# Patient Record
Sex: MALE | Race: WHITE | Employment: UNEMPLOYED | URBAN - METROPOLITAN AREA
[De-identification: names, ages, dates, MRNs, and addresses within clinical notes are randomized per-mention and may not be internally consistent; named-entity substitution may affect disease eponyms.]

---

## 2023-03-13 ENCOUNTER — APPOINTMENT (OUTPATIENT)
Dept: GENERAL RADIOLOGY | Age: 53
End: 2023-03-13

## 2023-03-13 ENCOUNTER — HOSPITAL ENCOUNTER (EMERGENCY)
Age: 53
Discharge: HOME | End: 2023-03-13

## 2023-03-13 ENCOUNTER — TELEPHONE (OUTPATIENT)
Dept: ORTHOPEDIC SURGERY | Age: 53
End: 2023-03-13

## 2023-03-13 VITALS
BODY MASS INDEX: 23.1 KG/M2 | OXYGEN SATURATION: 100 % | HEART RATE: 52 BPM | TEMPERATURE: 97.4 F | WEIGHT: 165 LBS | HEIGHT: 71 IN | RESPIRATION RATE: 16 BRPM | SYSTOLIC BLOOD PRESSURE: 145 MMHG | DIASTOLIC BLOOD PRESSURE: 82 MMHG

## 2023-03-13 DIAGNOSIS — S42.302A CLOSED FRACTURE OF SHAFT OF LEFT HUMERUS, UNSPECIFIED FRACTURE MORPHOLOGY, INITIAL ENCOUNTER: Primary | ICD-10-CM

## 2023-03-13 PROCEDURE — 6370000000 HC RX 637 (ALT 250 FOR IP): Performed by: NURSE PRACTITIONER

## 2023-03-13 PROCEDURE — 29105 APPLICATION LONG ARM SPLINT: CPT

## 2023-03-13 PROCEDURE — 99283 EMERGENCY DEPT VISIT LOW MDM: CPT

## 2023-03-13 PROCEDURE — 73060 X-RAY EXAM OF HUMERUS: CPT

## 2023-03-13 RX ORDER — IBUPROFEN 600 MG/1
600 TABLET ORAL ONCE
Status: DISCONTINUED | OUTPATIENT
Start: 2023-03-13 | End: 2023-03-13

## 2023-03-13 RX ORDER — ONDANSETRON 4 MG/1
4 TABLET, FILM COATED ORAL EVERY 8 HOURS PRN
Qty: 6 TABLET | Refills: 0 | Status: SHIPPED | OUTPATIENT
Start: 2023-03-13

## 2023-03-13 RX ORDER — ACETAMINOPHEN 500 MG
1000 TABLET ORAL ONCE
Status: COMPLETED | OUTPATIENT
Start: 2023-03-13 | End: 2023-03-13

## 2023-03-13 RX ORDER — HYDROCODONE BITARTRATE AND ACETAMINOPHEN 5; 325 MG/1; MG/1
1 TABLET ORAL EVERY 6 HOURS PRN
Qty: 12 TABLET | Refills: 0 | Status: SHIPPED | OUTPATIENT
Start: 2023-03-13 | End: 2023-03-16

## 2023-03-13 RX ADMIN — ACETAMINOPHEN 1000 MG: 500 TABLET ORAL at 09:52

## 2023-03-13 ASSESSMENT — ENCOUNTER SYMPTOMS
BACK PAIN: 0
RHINORRHEA: 0
SHORTNESS OF BREATH: 0
VOMITING: 0
DIARRHEA: 0
SORE THROAT: 0
ABDOMINAL PAIN: 0
EYE PAIN: 0
NAUSEA: 0
COUGH: 0

## 2023-03-13 ASSESSMENT — PAIN DESCRIPTION - DESCRIPTORS: DESCRIPTORS: DULL;ACHING

## 2023-03-13 ASSESSMENT — PAIN SCALES - GENERAL
PAINLEVEL_OUTOF10: 5
PAINLEVEL_OUTOF10: 6
PAINLEVEL_OUTOF10: 6

## 2023-03-13 ASSESSMENT — PAIN - FUNCTIONAL ASSESSMENT: PAIN_FUNCTIONAL_ASSESSMENT: 0-10

## 2023-03-13 ASSESSMENT — PAIN DESCRIPTION - ORIENTATION
ORIENTATION: LEFT

## 2023-03-13 ASSESSMENT — PAIN DESCRIPTION - FREQUENCY: FREQUENCY: CONTINUOUS

## 2023-03-13 ASSESSMENT — PAIN DESCRIPTION - LOCATION
LOCATION: ARM

## 2023-03-13 ASSESSMENT — PAIN DESCRIPTION - PAIN TYPE: TYPE: ACUTE PAIN

## 2023-03-13 NOTE — ED NOTES
Applied long arm splint to patient left arm. It was difficult to apply splint properly due to the patient not wanting to take pain meds. The patient kept moving and would not allow us to do the 90 degree angle properly due to the pain. I did the best I could with the circumstance. Placed the patient in a sling. Andrew Waggoner NP reviewed splint.       Xavier Estrada  02/73/[address]  10/95/10 1104

## 2023-03-13 NOTE — TELEPHONE ENCOUNTER
Appointment Request     Patient requesting earlier appointment: Yes  Appointment offered to patient: NO - NOTHING AVAILABLE   Patient Contact Number: [phone]    Pt [address] REQUESTING ASAP APPT WITH DR MARINO. WE LOOKED AT [address], OR WED. REQUESTING SOONEST APPT, AND PREFER  IF THE Pt CAN BE WORK IN  ON TUES. IF HE HAS TO WAIT, IT COULD BE Wednesday, BUT NOT LATER THAN Wednesday. Pt AWARE MESSAGE IS BEING SENT AND THAT SOMEONE WILL CALL HIM BACK.

## 2023-03-13 NOTE — Clinical Note
Paty Johnson was seen and treated in our emergency department on 3/13/2023. He may return to work on 03/16/2023. If you have any questions or concerns, please don't hesitate to call.       Anupama Vargas, DENNISE - CNP

## 2023-03-13 NOTE — ED NOTES
2321- perfect serve sent to [facility]     [address] [place] returned the call and spoke to [address]  03/13/23 [address]  03/13/23 9059

## 2023-03-13 NOTE — ED PROVIDER NOTES
[address] ED  EMERGENCY DEPARTMENT ENCOUNTER        Pt Name: Phillip Peña  MRN: [id number]  [place] 1970  Date of evaluation: 3/13/2023  Provider: DENNISE Peralta CNP  PCP: None Provider  Note Started: 9:25 AM EDT 3/13/23      VERONICA. I have evaluated this patient. My supervising physician was available for consultation. CHIEF COMPLAINT       Chief Complaint   Patient presents with    Fall     Pt reports that he fell this am on ice and landed on L arm. Pt c/o pain around L elbow. Pt denies injuries to head, or back or other areas     Arm Pain       HISTORY OF PRESENT ILLNESS: 1 or more Elements     History From: Patient    Limitations to history : None    Social Determinants Significantly Affecting Health : None    Chief Complaint: Left elbow pain    Phillip Peña is a 45 y.o. male who presents to the emergency department symptoms of left elbow pain after a fall. Patient reported that he was walking outside and slipped on ice causing a fall landed on his left elbow. Patient states that he suddenly now has pain in the left elbow. No open injury. No head or neck injury. No loss of consciousness. Denies any back pain chest pain or abdominal pain. Patient denies any pain in his lower extremities. Otherwise states feeling well. Nursing Notes were all reviewed and agreed with or any disagreements were addressed in the HPI. REVIEW OF SYSTEMS :      Review of Systems   Constitutional:  Negative for chills, diaphoresis and fever. HENT:  Negative for congestion, ear pain, rhinorrhea and sore throat. Eyes:  Negative for pain and visual disturbance. Respiratory:  Negative for cough and shortness of breath. Cardiovascular:  Negative for chest pain and leg swelling. Gastrointestinal:  Negative for abdominal pain, diarrhea, nausea and vomiting. Genitourinary:  Negative for difficulty urinating, dysuria, flank pain and frequency.    Musculoskeletal:  Negative for back pain and neck pain. Left elbow pain   Skin:  Negative for rash and wound. Neurological:  Negative for dizziness and light-headedness. Positives and Pertinent negatives as per HPI. SURGICAL HISTORY     Past Surgical History:   Procedure Laterality Date    ABDOMEN SURGERY      WOUND TO ABDOMEN    EYE SURGERY      LASMANDY       [address]       Discharge Medication List as of 3/13/2023 11:13 AM        CONTINUE these medications which have NOT CHANGED    Details   acetaminophen (TYLENOL) 500 MG tablet Take 500 mg by mouth every 6 hours as needed. ALLERGIES     Patient has no known allergies. FAMILYHISTORY       Family History   Problem Relation Age of Onset    Heart Disease Mother         CHF    High Blood Pressure Mother     High Cholesterol Mother     Heart Failure Mother     Heart Attack Mother         SOCIAL HISTORY       Social History     Tobacco Use    Smoking status: Every Day     Packs/day: 2.00     Years: 31.00     Pack years: 62.00     Types: Cigarettes    Smokeless tobacco: Never   Substance Use Topics    Alcohol use: Yes     Comment: SOCIALLY    Drug use: No       SCREENINGS        [place] Coma Scale  Eye Opening: Spontaneous  Best Verbal Response: Oriented  Best Motor Response: Obeys commands  [place] Coma Scale Score: 15                CIWA Assessment  BP: (!) 145/82  Heart Rate: 52           PHYSICAL EXAM  1 or more Elements     ED Triage Vitals [03/13/23 0858]   BP Temp Temp Source Heart Rate Resp SpO2 Height Weight   (!) 145/82 97.4 °F (36.3 °C) Oral 52 16 100 % 5' 11\" (1.803 m) 165 lb (74.8 kg)       Physical Exam  Vitals and nursing note reviewed. Constitutional:       Appearance: Normal appearance. He is not toxic-appearing or diaphoretic. HENT:      Head: Normocephalic and atraumatic. Nose: Nose normal.   Eyes:      General:         Right eye: No discharge. Left eye: No discharge.    Cardiovascular:      Rate and Rhythm: Normal rate and regular rhythm. Pulses: Normal pulses. Heart sounds: No murmur heard. Pulmonary:      Effort: Pulmonary effort is normal. No respiratory distress. Breath sounds: No wheezing or rhonchi. Abdominal:      Palpations: Abdomen is soft. Tenderness: There is no abdominal tenderness. There is no guarding or rebound. Musculoskeletal:         General: Normal range of motion. Left elbow: Tenderness present in medial epicondyle. Cervical back: Normal range of motion and neck supple. Right lower leg: No edema. Left lower leg: No edema. Skin:     General: Skin is warm and dry. Neurological:      General: No focal deficit present. Mental Status: He is alert and oriented to person, place, and time. Psychiatric:         Mood and Affect: Mood normal.         Behavior: Behavior normal.         DIAGNOSTIC RESULTS   LABS:    Labs Reviewed - No data to display    When ordered only abnormal lab results are displayed. All other labs were within normal range or not returned as of this dictation. EKG: When ordered, EKG's are interpreted by the Emergency Department Physician in the absence of a cardiologist.  Please see their note for interpretation of EKG. RADIOLOGY:   Non-plain film images such as CT, Ultrasound and MRI are read by the radiologist. Plain radiographic images are visualized and preliminarily interpreted by the ED Provider with the below findings:        Interpretation per the Radiologist below, if available at the time of this note:    XR HUMERUS LEFT (MIN 2 VIEWS)   Final Result   Displaced, significantly angulated and comminuted fracture in the midshaft of   left humerus. No results found. No results found.     PROCEDURES   Unless otherwise noted below, none     Procedures    CRITICAL CARE TIME (.cctime)       PAST MEDICAL HISTORY      has a past medical history of Heavy smoker (more than 20 cigarettes per day), Irregular heart beat, Pneumonia (2011), Wheezes, and Wound, incised. EMERGENCY DEPARTMENT COURSE and DIFFERENTIAL DIAGNOSIS/MDM:   Vitals:    Vitals:    03/13/23 0858   BP: (!) 145/82   Pulse: 52   Resp: 16   Temp: 97.4 °F (36.3 °C)   TempSrc: Oral   SpO2: 100%   Weight: 165 lb (74.8 kg)   Height: 5' 11\" (1.803 m)       Patient was given the following medications:  Medications   acetaminophen (TYLENOL) tablet 1,000 mg (1,000 mg Oral Given 3/13/23 5167)             Is this patient to be included in the SEP-1 Core Measure due to severe sepsis or septic shock? No   Exclusion criteria - the patient is NOT to be included for SEP-1 Core Measure due to: Infection is not suspected    Is this patient to be included in the SEP-1 core measure due to severe sepsis or septic shock? No   Exclusion criteria - the patient is NOT to be included for SEP-1 Core Measure due to: Infection is not suspected    Chronic Conditions affecting care:    has a past medical history of Heavy smoker (more than 20 cigarettes per day), Irregular heart beat, Pneumonia (2011), Wheezes, and Wound, incised. CONSULTS: (Who and What was discussed)  IP CONSULT TO ORTHOPEDIC SURGERY  I spoke with orthopedics Dr. лОьга Powell about the patient's current symptoms and presentation here in the emergency department. Discussed the midshaft humerus fracture which displays some angulation and deformity. After review and after reviewing the images he advised to go ahead and place the patient in a sling. Provide a posterior long-arm splint and a clamshell/consultation splint. Encouraged the patient to call Dr. Luca Maloney office for an appointment tomorrow. Records Reviewed (External and Source)     CC/HPI Summary, DDx, ED Course, and Reassessment: At this time plan is for the patient to be discharged from the emergency department. They are placed in a coaptation splint and posterior long-arm splint and placed in a sling by  tech.   Patient continues to be neurovascular intact with full sensation two-point discrimination involving the digits of the hand. Patient has 2+ radial pulse. Brisk cap refill. Patient has full range of motion of the wrist.  Extension and flexion noted. Patient has 5 out of 5 strength in the  of the right wrist and hand. Patient denies any shoulder pain. No head or neck injury. No loss of conscious. I discussed with the patient and encouraged him follow-up. I also notified Dr. Jordon Muñoz office the patient needs to have an appointment for tomorrow and they will work on provide the patient appointment. They will notify the patient and confirm phone number for patient. Patient was placed in a coaptation splint and posterior long-arm splint. Patient had initially refused narcotics. Patient states that he will take something for sleep tonight if needed but at this time does not want to be on narcotics. He was placed in the splint with only Tylenol for pain relief. I discussed with him that would be difficult for him to do tolerate splinting and patient did okay but was difficult for tech to splint. Disposition Considerations (tests considered but not done, Admit vs D/C, Shared Decision Making, Pt Expectation of Test or Tx.):       I am the Primary Clinician of Record. FINAL IMPRESSION      1. Closed fracture of shaft of left humerus, unspecified fracture morphology, initial encounter          DISPOSITION/PLAN     DISPOSITION Decision To Discharge 03/13/2023 11:11:24 AM      PATIENT REFERRED TO:  Roselia Smalls MD  [address] [facility]  [phone]    Schedule an appointment as soon as possible for a visit   Call today for an appointment tomorrow.     [facility]) Pre-Services  [phone]        DISCHARGE MEDICATIONS:  Discharge Medication List as of 3/13/2023 11:13 AM        START taking these medications    Details   HYDROcodone-acetaminophen (NORCO) 5-325 MG per tablet Take 1 tablet by mouth every 6 hours as needed for Pain for up to 3 days. Intended supply: 3 days.  Take lowest dose possible to manage pain Max Daily Amount: 4 tablets, Disp-12 tablet, R-0Normal      ondansetron (ZOFRAN) 4 MG tablet Take 1 tablet by mouth every 8 hours as needed for Nausea, Disp-6 tablet, R-0Normal             DISCONTINUED MEDICATIONS:  Discharge Medication List as of 3/13/2023 11:13 AM        STOP taking these medications       HYDROcodone-acetaminophen (CO-GESIC) 5-500 MG per tablet Comments:   Reason for Stopping:         ondansetron (ZOFRAN ODT) 4 MG disintegrating tablet Comments:   Reason for Stopping:                      (Please note that portions of this note were completed with a voice recognition program.  Efforts were made to edit the dictations but occasionally words are mis-transcribed.)    DENNISE Merchant CNP (electronically signed)       DENNISE Merchant CNP  03/13/23 1159

## 2023-03-13 NOTE — ED NOTES
--Patient provided with discharge instructions and any prescriptions. --Instructions, dosing, and follow-up appointments reviewed with patient/family. No further questions or needs at this time. --Vital signs and patient stable upon discharge. --Patient ambulatory to [facility].        Crystal Prince RN  03/13/23 6260

## 2023-03-14 ENCOUNTER — OFFICE VISIT (OUTPATIENT)
Dept: ORTHOPEDIC SURGERY | Age: 53
End: 2023-03-14

## 2023-03-14 VITALS — BODY MASS INDEX: 23.1 KG/M2 | HEIGHT: 71 IN | WEIGHT: 165 LBS

## 2023-03-14 DIAGNOSIS — S42.352A CLOSED DISPLACED COMMINUTED FRACTURE OF SHAFT OF LEFT HUMERUS, INITIAL ENCOUNTER: Primary | ICD-10-CM

## 2023-03-14 NOTE — PROGRESS NOTES
ORTHOPAEDIC SURGERY INITIAL EVALUATION NOTE  Chief Complaint   Patient presents with    Arm Pain     L Humerus Fx      HISTORY OF PRESENT ILLNESS:  47-year-old right-hand-dominant male presents for evaluation of the left upper extremity injury. He slipped on the ice when walking outside and fell directly onto his left elbow. He sustained a midshaft humerus fracture. He presented to the emergency department at the time of his injury at [facility].  He was placed into a splint. He was provided with orthopedic follow-up. He denies numbness or tingling. He complains of significant pain about his mid humerus. His pain is rated 8 out of 10 today. Past Medical History:   Diagnosis Date    Heavy smoker (more than 20 cigarettes per day)     CHAIN SMOKER    Irregular heart beat     Pneumonia 2011    Wheezes     SMOKER    Wound, incised     ABDOMEN       Current Outpatient Medications   Medication Sig Dispense Refill    HYDROcodone-acetaminophen (NORCO) 5-325 MG per tablet Take 1 tablet by mouth every 6 hours as needed for Pain for up to 3 days. Intended supply: 3 days. Take lowest dose possible to manage pain Max Daily Amount: 4 tablets 12 tablet 0    ondansetron (ZOFRAN) 4 MG tablet Take 1 tablet by mouth every 8 hours as needed for Nausea 6 tablet 0    acetaminophen (TYLENOL) 500 MG tablet Take 500 mg by mouth every 6 hours as needed. No current facility-administered medications for this visit.         Past Surgical History:   Procedure Laterality Date    ABDOMEN SURGERY      WOUND TO ABDOMEN    EYE SURGERY      LASIX       No Known Allergies    Family History   Problem Relation Age of Onset    Heart Disease Mother         CHF    High Blood Pressure Mother     High Cholesterol Mother     Heart Failure Mother     Heart Attack Mother        Social History     Socioeconomic History    Marital status: Legally      Spouse name: Not on file    Number of children: Not on file    Years of education: Not on file    Highest education level: Not on file   Occupational History    Not on file   Tobacco Use    Smoking status: Every Day     Packs/day: 2.00     Years: 31.00     Pack years: 62.00     Types: Cigarettes    Smokeless tobacco: Never   Substance and Sexual Activity    Alcohol use: Yes     Comment: SOCIALLY    Drug use: No    Sexual activity: Yes     Partners: Female   Other Topics Concern    Not on file   Social History Narrative    Not on file     Social Determinants of Health     Financial Resource Strain: Not on file   Food Insecurity: Not on file   Transportation Needs: Not on file   Physical Activity: Not on file   Stress: Not on file   Social Connections: Not on file   Intimate Partner Violence: Not on file   Housing Stability: Not on file     Review of Systems: Please see today's intake form for complete review of systems.    PHYSICAL EXAM  Gen: awake, alert, oriented  HEENT: atraumatic, normocephalic  Neck: supple  Resp: equal chest rise bilaterally, no audible wheezes, no accessory muscle use.   CV: Lower extremities warm and well perfused.  Abd: soft, nontender   Focused examination of the left upper extremity:  Presents in well-padded coaptation splint.  This is removed in order to examine the skin.  There is obvious deformity with apex anterior angulation.  There is moderate swelling of this area.  There are no cuts, open wounds, or abrasions. Sensation is intact to light touch in median, radial, ulnar, and axillary distributions.  Motor function is intact to AIN, PIN, ulnar motor nerves.  There is a strong palpable radial pulse, and brisk capillary refill to the fingers.  Compartments are soft and compressible    RADIOGRAPHIC EXAM:  3 views of the humerus including AP, oblique, and lateral view of the elbow from the emergency department demonstrate comminuted midshaft humerus fracture with isolated butterfly fragment.  There is 10 degrees of varus angulation.  There is apex anterior  angulation approximating 40 degrees on the incompletely imaged lateral of the fracture site. Repeat x-rays obtained today including AP and lateral projections of the humerus demonstrate improved alignment in Davies brace. There is no significant angulation on lateral.  There is 20% translation. There is approximately 18 degrees varus angulation. There is minor distraction at the fracture site. ASSESSEMENT AND PLAN    45 y.o. male with the following orthopaedic surgery problems:    Left closed midshaft humerus fracture    I discussed operative and nonoperative treatment options. The patient was interested in an initial trial of nonoperative management. He was imaged in a humeral fracture Davies brace with reasonable overall alignment. The brace was reviewed with the patient. He will wear this full-time with the exception of skin care and hygiene. It was recommended that he sleep semiupright in recliner to allow gravity to assist with reduction. Return to clinic in 3 weeks for repeat x-rays in the brace. Standard recovery course was described.     Kaitlin Cr MD

## 2023-04-21 ENCOUNTER — OFFICE VISIT (OUTPATIENT)
Dept: ORTHOPEDIC SURGERY | Age: 53
End: 2023-04-21

## 2023-04-21 VITALS — WEIGHT: 165 LBS | HEIGHT: 71 IN | BODY MASS INDEX: 23.1 KG/M2

## 2023-04-21 DIAGNOSIS — M79.602 PAIN OF LEFT UPPER EXTREMITY: ICD-10-CM

## 2023-04-21 DIAGNOSIS — S42.352D CLOSED DISPLACED COMMINUTED FRACTURE OF SHAFT OF LEFT HUMERUS WITH ROUTINE HEALING, SUBSEQUENT ENCOUNTER: Primary | ICD-10-CM

## 2023-04-21 RX ORDER — HYDROCODONE BITARTRATE AND ACETAMINOPHEN 5; 325 MG/1; MG/1
1 TABLET ORAL EVERY 6 HOURS PRN
Qty: 20 TABLET | Refills: 0 | Status: SHIPPED | OUTPATIENT
Start: 2023-04-21 | End: 2023-04-26